# Patient Record
Sex: FEMALE | URBAN - METROPOLITAN AREA
[De-identification: names, ages, dates, MRNs, and addresses within clinical notes are randomized per-mention and may not be internally consistent; named-entity substitution may affect disease eponyms.]

---

## 2020-11-04 ENCOUNTER — VIRTUAL VISIT (OUTPATIENT)
Dept: FAMILY MEDICINE | Facility: OTHER | Age: 30
End: 2020-11-04

## 2020-11-04 NOTE — PROGRESS NOTES
"Date: 2020 12:46:50  Clinician: Venus Colindres  Clinician NPI: [id number]  Patient: Cyndee Maravilla  Patient : 1990  Patient Address: [address] [place], [place] [id number]  Patient Phone: [phone]  Visit Protocol: URI  Patient Summary:  Cyndee is a 30 year old ( : 1990 ) female who initiated a [facility] Visit for COVID-19 (Coronavirus) evaluation and screening. When asked the question \"Please sign me up to receive news, health information and promotions from [facility].\", Cyndee responded \"No\".    When asked when her symptoms started, Cyndee reported that she does not have any symptoms.   She denies having recent facial or sinus surgery in the past 60 days.    Pertinent COVID-19 (Coronavirus) information  Cyndee does not work or volunteer as healthcare worker or a . In the past 14 days, Cyndee has not worked or volunteered at a healthcare facility or group living setting.   In the past 14 days, she also has not lived in a congregate living setting.   Cyndee has not had a close contact with a laboratory-confirmed COVID-19 patient within the last 14 days.    Since 2019, Cyndee has been tested for COVID-19 and has not had upper respiratory infection or influenza-like illness.      Result of COVID-19 test: Negative     Date of her COVID-19 test: 2020      Pertinent medical history  Cyndee has taken an antibiotic medication in the past month. Antibiotic details as reported by the patient (free text): Unsure of name - took for ear infection about 3-4 weeks ago   Cyndee does not get yeast infections when she takes antibiotics.   Cyndee does not need a return to work/school note.   Weight: 168 lbs   Cyndee does not smoke or use smokeless tobacco.   She denies pregnancy and denies breastfeeding. She is currently menstruating.   Weight: 168 lbs    MEDICATIONS: Jasmiel (29) oral, ALLERGIES: NKDA  Clinician Response:  Dear Cyndee,     We are not currently testing " asymptomatic patients unless they meet specific criteria which unfortunately you do not.&nbsp;   You may contact the [facility] for other possible locations where you may be tested. There website is :  https://[place].gov/covid19/for-[place]/if-sick/testing-locations/index.jsp    If you think you've been exposed:  What should I do?  For safety, it's very important to follow these rules. Do this for 14 days after the date you were last exposed to the virus.  Stay home and away from others. Don't go to work, school or anywhere else.  No hugging, kissing or shaking hands.  Don't let anyone visit.  Cover your mouth and nose with a mask, tissue or washcloth to avoid spreading germs.  Wash your hands and face often. Use soap and water.  What are the symptoms of COVID-19?  The most common symptoms are cough, fever and trouble breathing. Less common symptoms include headache, body aches, fatigue (feeling very tired), chills, sore throat, stuffy or runny nose, diarrhea (loose poop), loss of taste or smell, belly pain, and nausea or vomiting (feeling sick to your stomach or throwing up).  After 14 days, if you have still don't have symptoms, you likely don't have this virus.  If you develop symptoms, follow these guidelines.  If you're normally healthy:&nbsp;Please start another [facility] visit to report your symptoms. Go to [email or web address].  If you have a serious health problem&nbsp;(like cancer, heart failure, an organ transplant or kidney disease): Call your specialty clinic. Let them know that you might have COVID-19.  Where can I get more information?    [facility] -- About COVID-19:&nbsp;[email or web address]  CDC -- What to Do If You're Sick:&nbsp;www.cdc.gov/coronavirus/2019-ncov/about/steps-when-sick.html  CDC -- Ending Home Isolation:&nbsp;www.cdc.gov/coronavirus/2019-ncov/hcp/disposition-in-home-patients.html  CDC -- Caring for  Someone:&nbsp;www.cdc.gov/coronavirus/2019-ncov/if-you-are-sick/care-for-someone.html  [facility] -- Interim Guidance for Hospital Discharge to Home:&nbsp;www.health.[facility].mn.us/diseases/coronavirus/hcp/hospdischarge.pdf  [facility] clinical trials (COVID-19 research studies):&nbsp;clinicalaffairs.[facility]-clinical-trials  Below are the COVID-19 hotlines at the [place] Department of Health ([facility]). Interpreters are available.   For health questions: Call 691-836-4176 or 1-101.644.7791 (7 a.m. to 7 p.m.)For questions about schools and childcare: Call 288-691-1502 or 1-737.187.9563 (7 a.m. to 7 p.m.)     .&nbsp;        Diagnosis: Contact with and (suspected) exposure to other viral communicable diseases  Diagnosis ICD: Z20.828

## 2022-11-20 ENCOUNTER — OFFICE VISIT (OUTPATIENT)
Dept: FAMILY MEDICINE | Facility: CLINIC | Age: 32
End: 2022-11-20
Payer: COMMERCIAL

## 2022-11-20 VITALS
SYSTOLIC BLOOD PRESSURE: 115 MMHG | TEMPERATURE: 97.7 F | DIASTOLIC BLOOD PRESSURE: 73 MMHG | HEART RATE: 66 BPM | OXYGEN SATURATION: 99 %

## 2022-11-20 DIAGNOSIS — R68.89 FLU-LIKE SYMPTOMS: Primary | ICD-10-CM

## 2022-11-20 DIAGNOSIS — Z20.828 EXPOSURE TO INFLUENZA: ICD-10-CM

## 2022-11-20 DIAGNOSIS — M26.609 TMJ (TEMPOROMANDIBULAR JOINT SYNDROME): ICD-10-CM

## 2022-11-20 PROCEDURE — 99203 OFFICE O/P NEW LOW 30 MIN: CPT | Performed by: NURSE PRACTITIONER

## 2022-11-20 RX ORDER — DROSPIRENONE AND ETHINYL ESTRADIOL 0.02-3(28)
1 KIT ORAL DAILY
COMMUNITY

## 2022-11-20 RX ORDER — OSELTAMIVIR PHOSPHATE 75 MG/1
75 CAPSULE ORAL 2 TIMES DAILY
Qty: 10 CAPSULE | Refills: 0 | Status: SHIPPED | OUTPATIENT
Start: 2022-11-20 | End: 2022-11-25

## 2022-11-20 NOTE — LETTER
[address]  [facility] [address]  Phone: [phone]  Fax: [phone]    November 20, 2022        Farhana Cintron  [address]  [facility] [address]          To whom it may concern:    RE: Farhana Cintron    Patient was seen and treated today at our clinic and will need to take off work for Influenza. She may go back when fever free and symptoms improving.     Please contact me for questions or concerns.      Sincerely,        LIVAN Jones CNP

## 2022-11-20 NOTE — PROGRESS NOTES
SUBJECTIVE:   Farhana Cintron is a 32 year old female presenting with a chief complaint of   Chief Complaint   Patient presents with     URI     Fever 4 days ago which has resolved.      Abdominal Pain     Otalgia     Right ear.          URI Adult    Onset of symptoms was 2 day(s) ago.  Course of illness is worsening.    Severity moderate  Current and Associated symptoms: fever, sinus pressure, chills, runny nose, stuffy nose, cough - non-productive, wheezing, ear pain right, sore throat, body aches and fatigue  Treatment measures tried include Tylenol/Ibuprofen, Decongestants, OTC Cough med, Vaporizer, Fluids and Rest.  Predisposing factors include ill contact: Family member  and exposure to influenza.    Right ear hurt for many months now mild but not back to normal after she had COVID, end of summer drng/ earwax and hard to hear last week she used some kids ear drops did help a little      Review of Systems  Constitutional, eye, ENT, skin, respiratory, cardiac, GI, , MSK, neuro, and allergy are normal except as otherwise noted.     History reviewed. No pertinent past medical history.  No family history on file.  Current Outpatient Medications   Medication Sig Dispense Refill     drospirenone-ethinyl estradiol (HAFSA) 3-0.02 MG tablet Take 1 tablet by mouth daily       Social History     Tobacco Use     Smoking status: Never     Smokeless tobacco: Never   Substance Use Topics     Alcohol use: Not on file       OBJECTIVE  /73 (BP Location: Right arm, Patient Position: Sitting, Cuff Size: Adult Regular)   Pulse 66   Temp 97.7  F (36.5  C) (Oral)   SpO2 99%     Physical Exam  GENERAL: mildly ill appearing, alert and in no distress  EYES: Eyes grossly normal to inspection, no discharge.    HENT: Normocephalic, ear canals- normal; TMs- normal; No sinus tenderness Nose- normal with clear rhinnorhea; oropharynx with mild erythema no exudates, Mouth- no ulcers, no lesions and mucous membranes moist  Jaw: Popping  and clicking TMJ bilaterally  NECK: no tenderness, mild bilateral anterior cervical adenopathy, no asymmetry, no masses, no stiffness  RESP: lungs clear to auscultation - no rales, no rhonchi, no wheezes  CV: regular rates and rhythm, normal S1 S2, no S3 or S4 and no murmur, no click or rub  ABDOMEN: soft, no tenderness   MS: extremities- no gross deformities noted, no edema  SKIN: no suspicious lesions, no rashes, good skin turgor  NEURO: strength and tone- normal, sensory exam- grossly normal, mentation appears normal     Labs:  No results found for this or any previous visit (from the past 24 hour(s)).        ASSESSMENT/PLAN:      ICD-10-CM    1. Flu-like symptoms  R68.89 oseltamivir (TAMIFLU) 75 MG capsule      2. Exposure to influenza  Z20.828 oseltamivir (TAMIFLU) 75 MG capsule      3. TMJ (temporomandibular joint syndrome)  M26.609        discussed options to treat and wishes to get tamiflu, instructed on pros and cons and ear pain likely due to TMJ. She is due to see dentist so will discuss there and seek care if needs additional help with this, avoid chewing hard things or too much and symptoms mgmt discussed     Letter given for time off work     Followup:    If not improving or if condition worsens, follow up with your Primary Care Provider    See patient instructions     Catalina LICONA CNP FNP-C

## 2024-04-30 PROCEDURE — 99284 EMERGENCY DEPT VISIT MOD MDM: CPT | Performed by: EMERGENCY MEDICINE

## 2024-04-30 PROCEDURE — 99285 EMERGENCY DEPT VISIT HI MDM: CPT | Mod: 25 | Performed by: EMERGENCY MEDICINE

## 2024-05-01 ENCOUNTER — APPOINTMENT (OUTPATIENT)
Dept: CT IMAGING | Facility: CLINIC | Age: 34
End: 2024-05-01
Attending: EMERGENCY MEDICINE
Payer: COMMERCIAL

## 2024-05-01 ENCOUNTER — HOSPITAL ENCOUNTER (EMERGENCY)
Facility: CLINIC | Age: 34
Discharge: HOME | End: 2024-05-01
Attending: EMERGENCY MEDICINE | Admitting: EMERGENCY MEDICINE
Payer: COMMERCIAL

## 2024-05-01 ENCOUNTER — APPOINTMENT (OUTPATIENT)
Dept: GENERAL RADIOLOGY | Facility: CLINIC | Age: 34
End: 2024-05-01
Attending: EMERGENCY MEDICINE
Payer: COMMERCIAL

## 2024-05-01 VITALS
TEMPERATURE: 98 F | SYSTOLIC BLOOD PRESSURE: 128 MMHG | OXYGEN SATURATION: 98 % | DIASTOLIC BLOOD PRESSURE: 84 MMHG | RESPIRATION RATE: 20 BRPM | HEART RATE: 82 BPM
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
## 2024-05-01 LAB
HCG UR QL: NEGATIVE
INTERNAL QC OK POCT: NORMAL
POCT KIT EXPIRATION DATE: NORMAL
POCT KIT LOT NUMBER: NORMAL

## 2024-05-01 PROCEDURE — 70450 CT HEAD/BRAIN W/O DYE: CPT | Mod: 26 | Performed by: RADIOLOGY

## 2024-05-01 PROCEDURE — 72125 CT NECK SPINE W/O DYE: CPT | Mod: 26 | Performed by: RADIOLOGY

## 2024-05-01 PROCEDURE — 72128 CT CHEST SPINE W/O DYE: CPT

## 2024-05-01 PROCEDURE — 71046 X-RAY EXAM CHEST 2 VIEWS: CPT | Mod: 26 | Performed by: RADIOLOGY

## 2024-05-01 PROCEDURE — 70450 CT HEAD/BRAIN W/O DYE: CPT

## 2024-05-01 PROCEDURE — 72131 CT LUMBAR SPINE W/O DYE: CPT | Mod: 26 | Performed by: RADIOLOGY

## 2024-05-01 PROCEDURE — 72131 CT LUMBAR SPINE W/O DYE: CPT

## 2024-05-01 PROCEDURE — 72125 CT NECK SPINE W/O DYE: CPT

## 2024-05-01 PROCEDURE — 81025 URINE PREGNANCY TEST: CPT | Performed by: EMERGENCY MEDICINE

## 2024-05-01 PROCEDURE — 71046 X-RAY EXAM CHEST 2 VIEWS: CPT

## 2024-05-01 PROCEDURE — 250N000013 HC RX MED GY IP 250 OP 250 PS 637: Performed by: EMERGENCY MEDICINE

## 2024-05-01 PROCEDURE — 72128 CT CHEST SPINE W/O DYE: CPT | Mod: 26 | Performed by: RADIOLOGY

## 2024-05-01 RX ORDER — CYCLOBENZAPRINE HCL 10 MG
10 TABLET ORAL 3 TIMES DAILY PRN
Qty: 20 TABLET | Refills: 0 | Status: SHIPPED | OUTPATIENT
Start: 2024-05-01 | End: 2024-05-07

## 2024-05-01 RX ORDER — OXYCODONE HYDROCHLORIDE 5 MG/1
5 TABLET ORAL ONCE
Status: COMPLETED | OUTPATIENT
Start: 2024-05-01 | End: 2024-05-01

## 2024-05-01 RX ADMIN — OXYCODONE HYDROCHLORIDE 5 MG: 5 TABLET ORAL at 00:33

## 2024-05-01 ASSESSMENT — COLUMBIA-SUICIDE SEVERITY RATING SCALE - C-SSRS
1. IN THE PAST MONTH, HAVE YOU WISHED YOU WERE DEAD OR WISHED YOU COULD GO TO SLEEP AND NOT WAKE UP?: NO
2. HAVE YOU ACTUALLY HAD ANY THOUGHTS OF KILLING YOURSELF IN THE PAST MONTH?: NO
6. HAVE YOU EVER DONE ANYTHING, STARTED TO DO ANYTHING, OR PREPARED TO DO ANYTHING TO END YOUR LIFE?: NO

## 2024-05-01 ASSESSMENT — ACTIVITIES OF DAILY LIVING (ADL): ADLS_ACUITY_SCORE: 35
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
## 2024-05-01 NOTE — ED NOTES
Patient ambulatory on discharge. Reviewed all discharge instructions and patient verbalized understanding. Advised to follow up with PCP. VSS.

## 2024-05-01 NOTE — DISCHARGE INSTRUCTIONS
Please make an appointment to [facility] with [facility] (phone: [phone]) in 3-5 days if you have any concerns.    Return to the Emergency Department if you develop weakness, numbness, visual changes, uncontrollable vomiting or any concerns    If Farhana has discomfort from fever or other pain, she can have:  Acetaminophen (Tylenol) every 6 hours as needed. Her dose is:    2 regular strength tabs (650 mg)                                     (43.2+ kg/96+ lb)    NOTE: If your acetaminophen (Tylenol) came with a dropper marked with 0.4 and 0.8 ml, call us ([phone]) or check with your doctor about the dose before using it.     AND/OR      Ibuprofen (Advil, Motrin) every 6 hours as needed. His/her dose is:    2 regular strength tabs (400 mg)                                                                         (40-60 kg/ lb)
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
